# Patient Record
Sex: FEMALE | Race: WHITE | NOT HISPANIC OR LATINO | Employment: FULL TIME | URBAN - METROPOLITAN AREA
[De-identification: names, ages, dates, MRNs, and addresses within clinical notes are randomized per-mention and may not be internally consistent; named-entity substitution may affect disease eponyms.]

---

## 2018-10-11 ENCOUNTER — OFFICE VISIT (OUTPATIENT)
Dept: GASTROENTEROLOGY | Facility: CLINIC | Age: 28
End: 2018-10-11

## 2018-10-11 VITALS
WEIGHT: 200.6 LBS | HEIGHT: 63 IN | DIASTOLIC BLOOD PRESSURE: 66 MMHG | SYSTOLIC BLOOD PRESSURE: 118 MMHG | BODY MASS INDEX: 35.54 KG/M2

## 2018-10-11 DIAGNOSIS — K59.00 CONSTIPATION, UNSPECIFIED CONSTIPATION TYPE: ICD-10-CM

## 2018-10-11 DIAGNOSIS — R10.10 PAIN OF UPPER ABDOMEN: Primary | ICD-10-CM

## 2018-10-11 DIAGNOSIS — K92.1 BLOOD IN STOOL: ICD-10-CM

## 2018-10-11 PROCEDURE — 99203 OFFICE O/P NEW LOW 30 MIN: CPT | Performed by: INTERNAL MEDICINE

## 2018-10-11 RX ORDER — SODIUM PICOSULFATE, MAGNESIUM OXIDE, AND ANHYDROUS CITRIC ACID 10; 3.5; 12 MG/160ML; G/160ML; G/160ML
1 LIQUID ORAL 2 TIMES DAILY
Qty: 320 ML | Refills: 0 | Status: SHIPPED | OUTPATIENT
Start: 2018-10-11

## 2018-10-11 NOTE — PROGRESS NOTES
"    PATIENT INFORMATION  Pam Taylor       - 1990    CHIEF COMPLAINT  Chief Complaint   Patient presents with   • Abdominal Pain   • Constipation   • Diarrhea       HISTORY OF PRESENT ILLNESS  Abdominal Pain   Associated symptoms include constipation, diarrhea and nausea.   Constipation   Associated symptoms include abdominal pain, diarrhea and nausea.   Diarrhea    Associated symptoms include abdominal pain.     29 yo with several issues:  Abdominal pain is intermittent, epigastric and rib cage area. Occurs maybe 1-2 times monthly for the past 3-4 days. No radiation. No known aggravating factors. Heating pad did help.   She has had issues withconstipation since the birth of her first child. She does have a lot of rectal hesitancy and sensation of incomplete evacuation. She did note that intermittently she notes red stool.BM are 1-2 times weekly. She does have to strain a lot and notes thin stools then. No diarrhea.She has tried colace without help. miralax tried but she \"can't take powders\".   She had stool studies done at pcp office and was advised to come in.  No labs/stool studies available to review now.  Weight has been increasing.   No family history of crohns or UC.  No family history of colon cancer or polyp    No previous cls.  REVIEW OF SYSTEMS  Review of Systems   Gastrointestinal: Positive for abdominal pain, anal bleeding, constipation, diarrhea and nausea.   All other systems reviewed and are negative.    Ros reviewed.    ACTIVE PROBLEMS  Patient Active Problem List    Diagnosis   • Blood in stool [K92.1]   • Pain of upper abdomen [R10.10]   • Constipation [K59.00]         PAST MEDICAL HISTORY  History reviewed. No pertinent past medical history.      SURGICAL HISTORY  Past Surgical History:   Procedure Laterality Date   • FOOT SURGERY           FAMILY HISTORY  Family History   Problem Relation Age of Onset   • Colon cancer Neg Hx    • Colon polyps Neg Hx          SOCIAL HISTORY  Social " "History     Occupational History   • Not on file.     Social History Main Topics   • Smoking status: Never Smoker   • Smokeless tobacco: Never Used   • Alcohol use No   • Drug use: Unknown   • Sexual activity: Not on file       Debilities/Disabilities Identified: None    Emotional Behavior: Appropriate    CURRENT MEDICATIONS    Current Outpatient Prescriptions:   •  CLENPIQ 10-3.5-12 MG-GM -GM/160ML solution, Take 1 bottle by mouth 2 (Two) Times a Day., Disp: 320 mL, Rfl: 0    ALLERGIES  Penicillins    VITALS  Vitals:    10/11/18 1209   BP: 118/66   Weight: 91 kg (200 lb 9.6 oz)   Height: 160 cm (63\")       LAST RESULTS   Admission on 04/30/2014, Discharged on 04/30/2014   Component Date Value Ref Range Status   • WBC 04/30/2014 13.88* 4.80 - 10.80 K/Cumm Final   • RBC 04/30/2014 4.59  4.20 - 5.40 Million Final   • Hemoglobin 04/30/2014 12.8  12.0 - 16.0 g/dL Final   • Hematocrit 04/30/2014 38.5  37.0 - 47.0 % Final   • MCV 04/30/2014 83.9  81.0 - 99.0 fL Final   • MCH 04/30/2014 27.9  27.0 - 31.0 pg Final   • MCHC 04/30/2014 33.2  31.0 - 37.0 g/dL Final   • RDW-CV 04/30/2014 13.2  11.5 - 14.5 % Final   • Platelets 04/30/2014 343  140 - 500 K/Cumm Final   • MPV 04/30/2014 10.8* 7.4 - 10.4 fL Final   • Neutrophil Rel % 04/30/2014 70  45 - 70 % Final   • Lymphocyte Rel % 04/30/2014 20  20 - 45 % Final   • Monocyte Rel % 04/30/2014 6  3 - 8 % Final   • Eosinophil Rel % 04/30/2014 3  0 - 4 % Final   • Basophil Rel % 04/30/2014 1  0 - 2 % Final   • Neutrophils Absolute 04/30/2014 9.70* 1.50 - 8.30 K/Cumm Final   • Lymphocytes Absolute 04/30/2014 2.77  0.60 - 4.80 K/Cumm Final   • Monocytes Absolute 04/30/2014 0.88  0.00 - 1.00 K/Cumm Final   • Eosinophils Absolute 04/30/2014 0.45* 0.10 - 0.30 K/Cumm Final   • Basophils Absolute 04/30/2014 0.08  0.00 - 0.20 K/Cumm Final   • Glucose 04/30/2014 96  65 - 99 mg/dL Final   • BUN 04/30/2014 15  6 - 20 mg/dL Final   • Creatinine 04/30/2014 1.08* 0.57 - 1.00 mg/dL Final   • " Sodium 04/30/2014 137  136 - 145 mmol/L Final   • Potassium 04/30/2014 4.0  3.5 - 5.1 mmol/L Final   • Chloride 04/30/2014 100  98 - 107 mmol/L Final   • CO2 04/30/2014 24  22 - 29 mmol/L Final   • Total Protein 04/30/2014 7.6  6.4 - 8.3 g/dL Final   • Albumin 04/30/2014 4.3  3.5 - 5.2 g/dL Final   • AST (SGOT) 04/30/2014 24  0 - 31 U/L Final   • Calcium 04/30/2014 8.9  8.8 - 10.2 mg/dL Final   • Total Bilirubin 04/30/2014 0.2  0.1 - 1.2 mg/dL Final   • Alkaline Phosphatase 04/30/2014 86  40 - 129 U/L Final   • ALT (SGPT) 04/30/2014 22  0 - 33 U/L Final   • eGFR 04/30/2014 >60  ml/min/1.732 Final    Comment: DF by IF @ 04/30/2014 00:54  GFR Normal                            >60  Chronic Kidney Disease          <60  Kidney Failure                         <15     • HCG Urine, QL 04/30/2014 Negative   Final   • Color, UA 04/30/2014 Yellow  YELLOW Final   • Appearance, UA 04/30/2014 Clear  CLEAR Final   • Glucose, UA 04/30/2014 Negative  NEGATIVE mg/dL Final   • Bilirubin, UA 04/30/2014 Negative  NEGATIVE Final   • Ketones, UA 04/30/2014 Negative  NEGATIVE mg/dL Final   • Specific Gravity, UA 04/30/2014 1.020  1.003 - 1.030 Final   • pH, UA 04/30/2014 7.0  4.5 - 8.0 Final   • Protein, UA 04/30/2014 100* NEGATIVE,TRACE mg/dL Final   • Urobilinogen, UA 04/30/2014 0.2  0.2 - 1.0 E.U./dL Final   • Nitrite, UA 04/30/2014 Negative  NEGATIVE Final   • Blood, UA 04/30/2014 Large* NEGATIVE Final   • Leukocytes, UA 04/30/2014 Trace* NEGATIVE Final   • WBC, UA 04/30/2014 31-50* NONE SEEN,0-2,3-6 /hpf Final   • RBC, UA 04/30/2014 TNTC* NONE SEEN,0-2,3-6 /hpf Final   • Epithelial Cells, UA 04/30/2014 13-20* NONE SEEN,0-2,3-6 /hpf Final   • Bacteria, UA 04/30/2014 1+* NONE SEEN /hpf Final   • Casts 04/30/2014 None Seen   Final   • Observations 04/30/2014 WBC Clumps   Final     No results found.    PHYSICAL EXAM  Physical Exam   Constitutional: She is oriented to person, place, and time. She appears well-developed and well-nourished.  No distress.   HENT:   Head: Normocephalic and atraumatic.   Mouth/Throat: Oropharynx is clear and moist.   Eyes: Pupils are equal, round, and reactive to light. EOM are normal.   Neck: Normal range of motion. No tracheal deviation present.   Cardiovascular: Normal rate, regular rhythm, normal heart sounds and intact distal pulses.  Exam reveals no gallop and no friction rub.    No murmur heard.  Pulmonary/Chest: Effort normal and breath sounds normal. No stridor. No respiratory distress. She has no wheezes. She has no rales. She exhibits no tenderness.   Abdominal: Soft. Bowel sounds are normal. She exhibits no distension. There is no tenderness. There is no rebound and no guarding.   Musculoskeletal: She exhibits no edema.   Lymphadenopathy:     She has no cervical adenopathy.   Neurological: She is alert and oriented to person, place, and time.   Skin: Skin is warm. She is not diaphoretic.   Psychiatric: She has a normal mood and affect. Her behavior is normal. Judgment and thought content normal.   Nursing note and vitals reviewed.      ASSESSMENT  Diagnoses and all orders for this visit:    Pain of upper abdomen  -     TSH  -     Celiac Ab tTG DGP TIgA  -     CBC (No Diff)  -     Case Request; Standing  -     Case Request    Constipation, unspecified constipation type  -     TSH  -     Celiac Ab tTG DGP TIgA  -     CBC (No Diff)  -     Case Request; Standing  -     Case Request    Blood in stool  -     Case Request; Standing  -     Case Request    Other orders  -     Follow Anesthesia Guidelines / Standing Orders; Future  -     Implement Anesthesia orders day of procedure.; Standing  -     Obtain informed consent; Standing  -     CLENPIQ 10-3.5-12 MG-GM -GM/160ML solution; Take 1 bottle by mouth 2 (Two) Times a Day.          PLAN  No Follow-up on file.    Requested labs/stool studies from pcp office      Addendum: cbc with hemoglobin of 13.2, cmp with normal liver enzymes.  Urine with small blood. Fecal  calprotectin elevated at 94

## 2018-11-08 ENCOUNTER — ANESTHESIA EVENT (OUTPATIENT)
Dept: PERIOP | Facility: HOSPITAL | Age: 28
End: 2018-11-08

## 2018-11-09 ENCOUNTER — HOSPITAL ENCOUNTER (OUTPATIENT)
Facility: HOSPITAL | Age: 28
Setting detail: HOSPITAL OUTPATIENT SURGERY
Discharge: HOME | End: 2018-11-09
Attending: INTERNAL MEDICINE | Admitting: INTERNAL MEDICINE

## 2018-11-09 ENCOUNTER — ANESTHESIA (OUTPATIENT)
Dept: PERIOP | Facility: HOSPITAL | Age: 28
End: 2018-11-09

## 2018-11-09 VITALS
RESPIRATION RATE: 10 BRPM | DIASTOLIC BLOOD PRESSURE: 86 MMHG | SYSTOLIC BLOOD PRESSURE: 128 MMHG | WEIGHT: 201.6 LBS | OXYGEN SATURATION: 93 % | HEART RATE: 77 BPM | BODY MASS INDEX: 35.71 KG/M2 | TEMPERATURE: 97.9 F

## 2018-11-09 PROCEDURE — 45378 DIAGNOSTIC COLONOSCOPY: CPT | Performed by: INTERNAL MEDICINE

## 2018-11-09 PROCEDURE — 25010000002 PROPOFOL 10 MG/ML EMULSION: Performed by: NURSE ANESTHETIST, CERTIFIED REGISTERED

## 2018-11-09 RX ORDER — SODIUM CHLORIDE 0.9 % (FLUSH) 0.9 %
1-10 SYRINGE (ML) INJECTION AS NEEDED
Status: DISCONTINUED | OUTPATIENT
Start: 2018-11-09 | End: 2018-11-09 | Stop reason: HOSPADM

## 2018-11-09 RX ORDER — PROPOFOL 10 MG/ML
VIAL (ML) INTRAVENOUS AS NEEDED
Status: DISCONTINUED | OUTPATIENT
Start: 2018-11-09 | End: 2018-11-09 | Stop reason: SURG

## 2018-11-09 RX ORDER — SODIUM CHLORIDE, SODIUM LACTATE, POTASSIUM CHLORIDE, CALCIUM CHLORIDE 600; 310; 30; 20 MG/100ML; MG/100ML; MG/100ML; MG/100ML
9 INJECTION, SOLUTION INTRAVENOUS CONTINUOUS
Status: DISCONTINUED | OUTPATIENT
Start: 2018-11-09 | End: 2018-11-09 | Stop reason: HOSPADM

## 2018-11-09 RX ORDER — LIDOCAINE HYDROCHLORIDE 10 MG/ML
0.5 INJECTION, SOLUTION EPIDURAL; INFILTRATION; INTRACAUDAL; PERINEURAL ONCE AS NEEDED
Status: COMPLETED | OUTPATIENT
Start: 2018-11-09 | End: 2018-11-09

## 2018-11-09 RX ORDER — PROPOFOL 10 MG/ML
VIAL (ML) INTRAVENOUS CONTINUOUS PRN
Status: DISCONTINUED | OUTPATIENT
Start: 2018-11-09 | End: 2018-11-09 | Stop reason: SURG

## 2018-11-09 RX ORDER — ALBUTEROL SULFATE 90 UG/1
2 AEROSOL, METERED RESPIRATORY (INHALATION) EVERY 4 HOURS PRN
COMMUNITY

## 2018-11-09 RX ORDER — SODIUM CHLORIDE 9 MG/ML
40 INJECTION, SOLUTION INTRAVENOUS AS NEEDED
Status: DISCONTINUED | OUTPATIENT
Start: 2018-11-09 | End: 2018-11-09 | Stop reason: HOSPADM

## 2018-11-09 RX ORDER — SODIUM CHLORIDE 0.9 % (FLUSH) 0.9 %
3 SYRINGE (ML) INJECTION EVERY 12 HOURS SCHEDULED
Status: DISCONTINUED | OUTPATIENT
Start: 2018-11-09 | End: 2018-11-09 | Stop reason: HOSPADM

## 2018-11-09 RX ORDER — LIDOCAINE HYDROCHLORIDE 10 MG/ML
INJECTION, SOLUTION INFILTRATION; PERINEURAL AS NEEDED
Status: DISCONTINUED | OUTPATIENT
Start: 2018-11-09 | End: 2018-11-09 | Stop reason: SURG

## 2018-11-09 RX ORDER — GLYCOPYRROLATE 0.2 MG/ML
INJECTION INTRAMUSCULAR; INTRAVENOUS AS NEEDED
Status: DISCONTINUED | OUTPATIENT
Start: 2018-11-09 | End: 2018-11-09 | Stop reason: SURG

## 2018-11-09 RX ADMIN — PROPOFOL 50 MG: 10 INJECTION, EMULSION INTRAVENOUS at 11:30

## 2018-11-09 RX ADMIN — SODIUM CHLORIDE, POTASSIUM CHLORIDE, SODIUM LACTATE AND CALCIUM CHLORIDE: 600; 310; 30; 20 INJECTION, SOLUTION INTRAVENOUS at 11:27

## 2018-11-09 RX ADMIN — PROPOFOL 100 MCG/KG/MIN: 10 INJECTION, EMULSION INTRAVENOUS at 11:28

## 2018-11-09 RX ADMIN — LIDOCAINE HYDROCHLORIDE 0.5 ML: 10 INJECTION, SOLUTION EPIDURAL; INFILTRATION; INTRACAUDAL; PERINEURAL at 09:12

## 2018-11-09 RX ADMIN — PROPOFOL 50 MG: 10 INJECTION, EMULSION INTRAVENOUS at 11:28

## 2018-11-09 RX ADMIN — GLYCOPYRROLATE 0.1 MG: 0.2 INJECTION INTRAMUSCULAR; INTRAVENOUS at 11:28

## 2018-11-09 RX ADMIN — PROPOFOL 50 MG: 10 INJECTION, EMULSION INTRAVENOUS at 11:41

## 2018-11-09 RX ADMIN — SODIUM CHLORIDE, POTASSIUM CHLORIDE, SODIUM LACTATE AND CALCIUM CHLORIDE 9 ML/HR: 600; 310; 30; 20 INJECTION, SOLUTION INTRAVENOUS at 09:12

## 2018-11-09 RX ADMIN — PROPOFOL 30 MG: 10 INJECTION, EMULSION INTRAVENOUS at 11:47

## 2018-11-09 RX ADMIN — PROPOFOL 50 MG: 10 INJECTION, EMULSION INTRAVENOUS at 11:36

## 2018-11-09 RX ADMIN — LIDOCAINE HYDROCHLORIDE 50 MG: 10 INJECTION, SOLUTION INFILTRATION; PERINEURAL at 11:28

## 2018-11-09 NOTE — H&P
" - 1990     CHIEF COMPLAINT      Chief Complaint   Patient presents with   • Abdominal Pain   • Constipation   • Diarrhea         HISTORY OF PRESENT ILLNESS  Abdominal Pain   Associated symptoms include constipation, diarrhea and nausea.   Constipation   Associated symptoms include abdominal pain, diarrhea and nausea.   Diarrhea    Associated symptoms include abdominal pain.      27 yo with several issues:  Abdominal pain is intermittent, epigastric and rib cage area. Occurs maybe 1-2 times monthly for the past 3-4 days. No radiation. No known aggravating factors. Heating pad did help.   She has had issues withconstipation since the birth of her first child. She does have a lot of rectal hesitancy and sensation of incomplete evacuation. She did note that intermittently she notes red stool.BM are 1-2 times weekly. She does have to strain a lot and notes thin stools then. No diarrhea.She has tried colace without help. miralax tried but she \"can't take powders\".   She had stool studies done at pcp office and was advised to come in.  No labs/stool studies available to review now.  Weight has been increasing.   No family history of crohns or UC.  No family history of colon cancer or polyp     No previous cls.  REVIEW OF SYSTEMS  Review of Systems   Gastrointestinal: Positive for abdominal pain, anal bleeding, constipation, diarrhea and nausea.   All other systems reviewed and are negative.     Ros reviewed.     ACTIVE PROBLEMS      Patient Active Problem List     Diagnosis   • Blood in stool [K92.1]   • Pain of upper abdomen [R10.10]   • Constipation [K59.00]            PAST MEDICAL HISTORY  Medical History   History reviewed. No pertinent past medical history.           SURGICAL HISTORY  Surgical History         Past Surgical History:   Procedure Laterality Date   • FOOT SURGERY                   FAMILY HISTORY  Family History   Problem Relation Age of Onset   • Colon cancer Neg Hx     • Colon polyps Neg Hx   "            SOCIAL HISTORY  Social History          Occupational History   • Not on file.           Social History Main Topics   • Smoking status: Never Smoker   • Smokeless tobacco: Never Used   • Alcohol use No   • Drug use: Unknown   • Sexual activity: Not on file         Debilities/Disabilities Identified: None     Emotional Behavior: Appropriate     CURRENT MEDICATIONS     Current Outpatient Prescriptions:   •  CLENPIQ 10-3.5-12 MG-GM -GM/160ML solution, Take 1 bottle by mouth 2 (Two) Times a Day., Disp: 320 mL, Rfl: 0     ALLERGIES  Penicillins     VITALS  Vitals   /61 (BP Location: Left arm, Patient Position: Sitting)   Pulse 83   Temp 97.9 °F (36.6 °C) (Oral)   Resp 16   Wt 91.4 kg (201 lb 9.6 oz)   LMP 10/26/2018   SpO2 100%   BMI 35.71 kg/m²                                   LAST RESULTS                   Admission on 04/30/2014, Discharged on 04/30/2014   Component Date Value Ref Range Status   • WBC 04/30/2014 13.88* 4.80 - 10.80 K/Cumm Final   • RBC 04/30/2014 4.59  4.20 - 5.40 Million Final   • Hemoglobin 04/30/2014 12.8  12.0 - 16.0 g/dL Final   • Hematocrit 04/30/2014 38.5  37.0 - 47.0 % Final   • MCV 04/30/2014 83.9  81.0 - 99.0 fL Final   • MCH 04/30/2014 27.9  27.0 - 31.0 pg Final   • MCHC 04/30/2014 33.2  31.0 - 37.0 g/dL Final   • RDW-CV 04/30/2014 13.2  11.5 - 14.5 % Final   • Platelets 04/30/2014 343  140 - 500 K/Cumm Final   • MPV 04/30/2014 10.8* 7.4 - 10.4 fL Final   • Neutrophil Rel % 04/30/2014 70  45 - 70 % Final   • Lymphocyte Rel % 04/30/2014 20  20 - 45 % Final   • Monocyte Rel % 04/30/2014 6  3 - 8 % Final   • Eosinophil Rel % 04/30/2014 3  0 - 4 % Final   • Basophil Rel % 04/30/2014 1  0 - 2 % Final   • Neutrophils Absolute 04/30/2014 9.70* 1.50 - 8.30 K/Cumm Final   • Lymphocytes Absolute 04/30/2014 2.77  0.60 - 4.80 K/Cumm Final   • Monocytes Absolute 04/30/2014 0.88  0.00 - 1.00 K/Cumm Final   • Eosinophils Absolute 04/30/2014 0.45* 0.10 - 0.30 K/Cumm Final   •  Basophils Absolute 04/30/2014 0.08  0.00 - 0.20 K/Cumm Final   • Glucose 04/30/2014 96  65 - 99 mg/dL Final   • BUN 04/30/2014 15  6 - 20 mg/dL Final   • Creatinine 04/30/2014 1.08* 0.57 - 1.00 mg/dL Final   • Sodium 04/30/2014 137  136 - 145 mmol/L Final   • Potassium 04/30/2014 4.0  3.5 - 5.1 mmol/L Final   • Chloride 04/30/2014 100  98 - 107 mmol/L Final   • CO2 04/30/2014 24  22 - 29 mmol/L Final   • Total Protein 04/30/2014 7.6  6.4 - 8.3 g/dL Final   • Albumin 04/30/2014 4.3  3.5 - 5.2 g/dL Final   • AST (SGOT) 04/30/2014 24  0 - 31 U/L Final   • Calcium 04/30/2014 8.9  8.8 - 10.2 mg/dL Final   • Total Bilirubin 04/30/2014 0.2  0.1 - 1.2 mg/dL Final   • Alkaline Phosphatase 04/30/2014 86  40 - 129 U/L Final   • ALT (SGPT) 04/30/2014 22  0 - 33 U/L Final   • eGFR 04/30/2014 >60  ml/min/1.732 Final     Comment: DF by IF @ 04/30/2014 00:54  GFR Normal                            >60  Chronic Kidney Disease          <60  Kidney Failure                         <15      • HCG Urine, QL 04/30/2014 Negative    Final   • Color, UA 04/30/2014 Yellow  YELLOW Final   • Appearance, UA 04/30/2014 Clear  CLEAR Final   • Glucose, UA 04/30/2014 Negative  NEGATIVE mg/dL Final   • Bilirubin, UA 04/30/2014 Negative  NEGATIVE Final   • Ketones, UA 04/30/2014 Negative  NEGATIVE mg/dL Final   • Specific Gravity, UA 04/30/2014 1.020  1.003 - 1.030 Final   • pH, UA 04/30/2014 7.0  4.5 - 8.0 Final   • Protein, UA 04/30/2014 100* NEGATIVE,TRACE mg/dL Final   • Urobilinogen, UA 04/30/2014 0.2  0.2 - 1.0 E.U./dL Final   • Nitrite, UA 04/30/2014 Negative  NEGATIVE Final   • Blood, UA 04/30/2014 Large* NEGATIVE Final   • Leukocytes, UA 04/30/2014 Trace* NEGATIVE Final   • WBC, UA 04/30/2014 31-50* NONE SEEN,0-2,3-6 /hpf Final   • RBC, UA 04/30/2014 TNTC* NONE SEEN,0-2,3-6 /hpf Final   • Epithelial Cells, UA 04/30/2014 13-20* NONE SEEN,0-2,3-6 /hpf Final   • Bacteria, UA 04/30/2014 1+* NONE SEEN /hpf Final   • Casts 04/30/2014 None Seen     Final   • Observations 04/30/2014 WBC Clumps    Final      No results found.     PHYSICAL EXAM  Physical Exam   Constitutional: She is oriented to person, place, and time. She appears well-developed and well-nourished. No distress.   HENT:   Head: Normocephalic and atraumatic.   Mouth/Throat: Oropharynx is clear and moist.   Eyes: Pupils are equal, round, and reactive to light. EOM are normal.   Neck: Normal range of motion. No tracheal deviation present.   Cardiovascular: Normal rate, regular rhythm, normal heart sounds and intact distal pulses.  Exam reveals no gallop and no friction rub.    No murmur heard.  Pulmonary/Chest: Effort normal and breath sounds normal. No stridor. No respiratory distress. She has no wheezes. She has no rales. She exhibits no tenderness.   Abdominal: Soft. Bowel sounds are normal. She exhibits no distension. There is no tenderness. There is no rebound and no guarding.   Musculoskeletal: She exhibits no edema.   Lymphadenopathy:     She has no cervical adenopathy.   Neurological: She is alert and oriented to person, place, and time.   Skin: Skin is warm. She is not diaphoretic.   Psychiatric: She has a normal mood and affect. Her behavior is normal. Judgment and thought content normal.   Nursing note and vitals reviewed.        ASSESSMENT  Diagnoses and all orders for this visit:     Pain of upper abdomen  -     TSH  -     Celiac Ab tTG DGP TIgA  -     CBC (No Diff)  -     Case Request; Standing  -     Case Request     Constipation, unspecified constipation type  -     TSH  -     Celiac Ab tTG DGP TIgA  -     CBC (No Diff)  -     Case Request; Standing  -     Case Request     Blood in stool  -     Case Request; Standing  -     Case Request     Other orders  -     Follow Anesthesia Guidelines / Standing Orders; Future  -     Implement Anesthesia orders day of procedure.; Standing  -     Obtain informed consent; Standing  -     CLENPIQ 10-3.5-12 MG-GM -GM/160ML solution; Take 1 bottle by  mouth 2 (Two) Times a Day.              PLAN  No Follow-up on file.     Requested labs/stool studies from pcp office        Addendum: cbc with hemoglobin of 13.2, cmp with normal liver enzymes.  Urine with small blood. Fecal calprotectin elevated at 94

## 2018-11-09 NOTE — ANESTHESIA POSTPROCEDURE EVALUATION
Patient: Pam Taylor    Procedure Summary     Date:  11/09/18 Room / Location:  [facility] ENDOSCOPY 2 /  [facility]    Anesthesia Start:  1127 Anesthesia Stop:  1151    Procedure:  COLONOSCOPY (N/A ) Diagnosis:       Blood in stool      Pain of upper abdomen      Constipation, unspecified constipation type      (Blood in stool [K92.1])      (Pain of upper abdomen [R10.10])      (Constipation, unspecified constipation type [K59.00])    Surgeon:  Nyla Ames MD Provider:  Joe España CRNA    Anesthesia Type:  MAC ASA Status:  2          Anesthesia Type: MAC  Last vitals  BP   128/86 (11/09/18 1154)   Temp   97.9 °F (36.6 °C) (11/09/18 0853)   Pulse   77 (11/09/18 1154)   Resp   10 (11/09/18 1154)     SpO2   93 % (11/09/18 1154)     Post Anesthesia Care and Evaluation    Patient location during evaluation: PHASE II  Patient participation: complete - patient participated  Level of consciousness: awake and alert  Pain score: 0  Pain management: adequate  Airway patency: patent  Anesthetic complications: No anesthetic complications  PONV Status: none  Cardiovascular status: acceptable  Respiratory status: acceptable  Hydration status: acceptable

## 2018-11-09 NOTE — OP NOTE
Patient Name:  Pam Taylor  YOB: 1990    Date of Procedure:  11/9/2018    Procedure Performed: Colonoscopy     Indications:  Blood in stool, abdominal pain constipation    Pre-op Diagnosis:   Blood in stool [K92.1]  Pain of upper abdomen [R10.10]  Constipation, unspecified constipation type [K59.00]    Post-Op Diagnosis Codes:     * Blood in stool [K92.1]     * Pain of upper abdomen [R10.10]     * Constipation, unspecified constipation type [K59.00]         Staff:  Surgeon(s):  Nyla Ames MD         Consent: Procedure colonoscopy Indications, risks and procedure were discussed with the patient, including but not limited to, bleeding, infection, possibility of perforation and possible polypectomy. All of the patient's questions were answered, and signed informed consent was obtained and placed on the chart.      Sedation: Sedation was provided by anesthesia.      Estimated Blood Loss: none    Specimens: None      Description of Procedure: After excellent sedation a digital rectal examination is performed and a flexible colonoscope was inserted into the rectum passed to the cecum.  The cecum was identified by both the ileo- cecal valve and the appendiceal orifice.  The terminal ileum was entered this appeared normal.  Upon withdrawal scope careful examination the mucosa was made.  The entire examined colonic mucosa was normal without any evidence of inflammation.  No polyps are noted.  The scope was slowly withdrawn to the rectum and retroflex were internal hemorrhoids are noted.  The scope was then straightened and withdrawn out of the patient with no immediate, patient's and she tolerated the procedure well.      Findings:   Internal hemorrhoids otherwise normal colonoscopy    Complications: None        Nyla Ames MD     Date: 11/9/2018  Time: 11:52 AM

## 2018-11-09 NOTE — ANESTHESIA PREPROCEDURE EVALUATION
Anesthesia Evaluation     Patient summary reviewed and Nursing notes reviewed   NPO Solid Status: > 8 hours  NPO Liquid Status: > 8 hours           Airway   Mallampati: II  TM distance: >3 FB  Neck ROM: full  No difficulty expected  Dental - normal exam     Pulmonary - normal exam    breath sounds clear to auscultation  (+) asthma (hospitalized as small child with asthma.),   Cardiovascular - negative cardio ROS and normal exam    Rhythm: regular  Rate: normal        Neuro/Psych- negative ROS  GI/Hepatic/Renal/Endo    (+)  GI bleeding (blood in stool),     Musculoskeletal (-) negative ROS    Abdominal    Substance History - negative use     OB/GYN          Other - negative ROS                       Anesthesia Plan    ASA 2     MAC     intravenous induction   Anesthetic plan, all risks, benefits, and alternatives have been provided, discussed and informed consent has been obtained with: patient.

## 2023-12-06 ENCOUNTER — OFFICE VISIT (OUTPATIENT)
Dept: GASTROENTEROLOGY | Facility: CLINIC | Age: 33
End: 2023-12-06
Payer: COMMERCIAL

## 2023-12-06 VITALS
WEIGHT: 207.8 LBS | SYSTOLIC BLOOD PRESSURE: 120 MMHG | BODY MASS INDEX: 36.82 KG/M2 | DIASTOLIC BLOOD PRESSURE: 78 MMHG | HEIGHT: 63 IN

## 2023-12-06 DIAGNOSIS — K59.04 CHRONIC IDIOPATHIC CONSTIPATION: ICD-10-CM

## 2023-12-06 DIAGNOSIS — R10.13 DYSPEPSIA: Primary | ICD-10-CM

## 2023-12-06 PROCEDURE — 99204 OFFICE O/P NEW MOD 45 MIN: CPT | Performed by: INTERNAL MEDICINE

## 2023-12-06 RX ORDER — BUDESONIDE AND FORMOTEROL FUMARATE DIHYDRATE 160; 4.5 UG/1; UG/1
2 AEROSOL RESPIRATORY (INHALATION) AS NEEDED
COMMUNITY
Start: 2023-10-23

## 2023-12-06 RX ORDER — OMEGA-3 FATTY ACIDS/FISH OIL 300-1000MG
CAPSULE ORAL AS NEEDED
COMMUNITY

## 2023-12-06 RX ORDER — OMEPRAZOLE 40 MG/1
1 CAPSULE, DELAYED RELEASE ORAL DAILY
COMMUNITY
Start: 2023-10-23

## 2023-12-06 NOTE — PROGRESS NOTES
"    PATIENT INFORMATION  Pam Taylor       - 1990    CHIEF COMPLAINT  Chief Complaint   Patient presents with    Heartburn       HISTORY OF PRESENT ILLNESS  Is here after seeing Kwaku for rectal bleeding but really her complaints are for nocturnal burping and epigastric pain bloating and takes NSAIDS for neck injury/HA    Also can go a week w/o a BM so still has those issues    Has only been taking her omeprazole 40 mg daily some before supper and some in the AM - and has had a good response to that medicine.    No HP testing that she is aware of            REVIEWED PERTINENT RESULTS/ LABS  No results found for: \"CASEREPORT\", \"FINALDX\"  Lab Results   Component Value Date    HGB 12.8 2014    MCV 83.9 2014     2014    ALT 22 2014    AST 24 2014      No results found.    REVIEW OF SYSTEMS  Review of Systems   Constitutional:  Negative for activity change, chills, fever and unexpected weight change.   HENT:  Negative for congestion.    Eyes:  Negative for visual disturbance.   Respiratory:  Negative for shortness of breath.    Cardiovascular:  Negative for chest pain and palpitations.   Gastrointestinal:  Positive for abdominal distention, abdominal pain, anal bleeding (ext hemorrhoid), blood in stool, constipation and diarrhea.   Endocrine: Negative for cold intolerance and heat intolerance.   Genitourinary:  Negative for hematuria.   Musculoskeletal:  Negative for gait problem.   Skin:  Negative for color change.   Allergic/Immunologic: Negative for immunocompromised state.   Neurological:  Negative for weakness and light-headedness.   Hematological:  Negative for adenopathy.   Psychiatric/Behavioral:  Negative for sleep disturbance. The patient is not nervous/anxious.          ACTIVE PROBLEMS  Patient Active Problem List    Diagnosis     Blood in stool [K92.1]     Pain of upper abdomen [R10.10]     Constipation [K59.00]          PAST MEDICAL HISTORY  Past Medical " "History:   Diagnosis Date    Asthma     Asthma     GERD (gastroesophageal reflux disease)          SURGICAL HISTORY  Past Surgical History:   Procedure Laterality Date    COLONOSCOPY N/A 11/09/2018    Procedure: COLONOSCOPY;  Surgeon: Nyla Ames MD;  Location: [facility];  Service: Gastroenterology    FOOT SURGERY      LAPAROSCOPIC TUBAL LIGATION           FAMILY HISTORY  Family History   Problem Relation Age of Onset    Hypertension Father     Esophagitis Sister     SUBHASH disease Sister     Irritable bowel syndrome Sister     Diverticulitis Sister     Irritable bowel syndrome Sister     Colon cancer Neg Hx     Colon polyps Neg Hx          SOCIAL HISTORY  Social History     Occupational History    Not on file   Tobacco Use    Smoking status: Never     Passive exposure: Never    Smokeless tobacco: Never    Tobacco comments:     N/A   Vaping Use    Vaping Use: Never used   Substance and Sexual Activity    Alcohol use: Yes     Comment: socialy    Drug use: No    Sexual activity: Defer         CURRENT MEDICATIONS    Current Outpatient Medications:     albuterol (PROVENTIL HFA;VENTOLIN HFA) 108 (90 Base) MCG/ACT inhaler, Inhale 2 puffs Every 4 (Four) Hours As Needed for Wheezing., Disp: , Rfl:     budesonide-formoterol (SYMBICORT) 160-4.5 MCG/ACT inhaler, Inhale 2 puffs As Needed., Disp: , Rfl:     Ibuprofen 200 MG capsule, Take  by mouth As Needed., Disp: , Rfl:     omeprazole (priLOSEC) 40 MG capsule, Take 1 capsule by mouth Daily., Disp: , Rfl:     ALLERGIES  Augmentin [amoxicillin-pot clavulanate] and Penicillins    VITALS  Vitals:    12/06/23 0851   BP: 120/78   BP Location: Left arm   Patient Position: Sitting   Cuff Size: Large Adult   Weight: 94.3 kg (207 lb 12.8 oz)   Height: 160 cm (63\")       PHYSICAL EXAM  Debilities/Disabilities Identified: None  Emotional Behavior: Appropriate  Wt Readings from Last 3 Encounters:   12/06/23 94.3 kg (207 lb 12.8 oz)   11/09/18 91.4 kg (201 lb 9.6 oz)   10/11/18 91 kg (200 " "lb 9.6 oz)     Ht Readings from Last 1 Encounters:   12/06/23 160 cm (63\")     Body mass index is 36.81 kg/m².  Physical Exam  Constitutional:       Appearance: She is well-developed. She is not diaphoretic.   HENT:      Head: Normocephalic and atraumatic.   Eyes:      General: No scleral icterus.     Conjunctiva/sclera: Conjunctivae normal.      Pupils: Pupils are equal, round, and reactive to light.   Neck:      Thyroid: No thyromegaly.   Cardiovascular:      Rate and Rhythm: Normal rate and regular rhythm.      Heart sounds: Normal heart sounds. No murmur heard.     No gallop.   Pulmonary:      Effort: Pulmonary effort is normal.      Breath sounds: Normal breath sounds. No wheezing or rales.   Abdominal:      General: Bowel sounds are normal. There is no distension or abdominal bruit.      Palpations: Abdomen is soft. There is no shifting dullness, fluid wave or mass.      Tenderness: There is abdominal tenderness in the right upper quadrant, right lower quadrant and epigastric area. There is no guarding. Negative signs include Rodriguez's sign.      Hernia: There is no hernia in the ventral area.   Musculoskeletal:         General: Normal range of motion.      Cervical back: Normal range of motion and neck supple.   Lymphadenopathy:      Cervical: No cervical adenopathy.   Skin:     General: Skin is warm and dry.      Findings: No erythema or rash.   Neurological:      Mental Status: She is alert and oriented to person, place, and time.   Psychiatric:         Mood and Affect: Mood normal.         Behavior: Behavior normal.         CLINICAL DATA REVIEWED   reviewed previous lab results and integrated with today's visit, reviewed notes from other physicians and/or last GI encounter, reviewed previous endoscopy results and available photos, reviewed surgical pathology results from previous biopsies    ASSESSMENT  Diagnoses and all orders for this visit:    Dyspepsia  -     Case Request; Standing  -     Case " Request    Chronic idiopathic constipation    Other orders  -     budesonide-formoterol (SYMBICORT) 160-4.5 MCG/ACT inhaler; Inhale 2 puffs As Needed.  -     Ibuprofen 200 MG capsule; Take  by mouth As Needed.  -     omeprazole (priLOSEC) 40 MG capsule; Take 1 capsule by mouth Daily.  -     Obtain Informed Consent; Standing  -     Follow Anesthesia Guidelines / Protocol; Future          PLAN  Reviewed suportive care forher GERD and Constipation and edwige send Linzess  Return in about 2 months (around 2/6/2024).    I have discussed the above plan with the patient.  They verbalize understanding and are in agreement with the plan.  They have been advised to contact the office for any questions, concerns, or changes related to their health.

## 2024-01-30 ENCOUNTER — ANESTHESIA EVENT (OUTPATIENT)
Dept: PERIOP | Facility: HOSPITAL | Age: 34
End: 2024-01-30
Payer: COMMERCIAL

## 2024-01-31 ENCOUNTER — ANESTHESIA (OUTPATIENT)
Dept: PERIOP | Facility: HOSPITAL | Age: 34
End: 2024-01-31
Payer: COMMERCIAL

## 2024-01-31 ENCOUNTER — HOSPITAL ENCOUNTER (OUTPATIENT)
Facility: HOSPITAL | Age: 34
Setting detail: HOSPITAL OUTPATIENT SURGERY
Discharge: HOME | End: 2024-01-31
Attending: INTERNAL MEDICINE | Admitting: INTERNAL MEDICINE
Payer: COMMERCIAL

## 2024-01-31 VITALS
DIASTOLIC BLOOD PRESSURE: 64 MMHG | OXYGEN SATURATION: 100 % | WEIGHT: 205 LBS | RESPIRATION RATE: 16 BRPM | TEMPERATURE: 97.9 F | HEART RATE: 71 BPM | BODY MASS INDEX: 36.31 KG/M2 | SYSTOLIC BLOOD PRESSURE: 112 MMHG

## 2024-01-31 DIAGNOSIS — R10.13 DYSPEPSIA: ICD-10-CM

## 2024-01-31 PROCEDURE — 88305 TISSUE EXAM BY PATHOLOGIST: CPT | Performed by: INTERNAL MEDICINE

## 2024-01-31 PROCEDURE — 88342 IMHCHEM/IMCYTCHM 1ST ANTB: CPT | Performed by: INTERNAL MEDICINE

## 2024-01-31 PROCEDURE — 43239 EGD BIOPSY SINGLE/MULTIPLE: CPT | Performed by: INTERNAL MEDICINE

## 2024-01-31 PROCEDURE — 25010000002 PROPOFOL 10 MG/ML EMULSION: Performed by: NURSE ANESTHETIST, CERTIFIED REGISTERED

## 2024-01-31 PROCEDURE — 25810000003 LACTATED RINGERS PER 1000 ML: Performed by: NURSE ANESTHETIST, CERTIFIED REGISTERED

## 2024-01-31 PROCEDURE — 88312 SPECIAL STAINS GROUP 1: CPT | Performed by: INTERNAL MEDICINE

## 2024-01-31 PROCEDURE — 88313 SPECIAL STAINS GROUP 2: CPT | Performed by: INTERNAL MEDICINE

## 2024-01-31 PROCEDURE — 88341 IMHCHEM/IMCYTCHM EA ADD ANTB: CPT | Performed by: INTERNAL MEDICINE

## 2024-01-31 RX ORDER — SODIUM CHLORIDE, SODIUM LACTATE, POTASSIUM CHLORIDE, CALCIUM CHLORIDE 600; 310; 30; 20 MG/100ML; MG/100ML; MG/100ML; MG/100ML
9 INJECTION, SOLUTION INTRAVENOUS CONTINUOUS PRN
Status: DISCONTINUED | OUTPATIENT
Start: 2024-01-31 | End: 2024-01-31 | Stop reason: HOSPADM

## 2024-01-31 RX ORDER — OMEPRAZOLE 40 MG/1
40 CAPSULE, DELAYED RELEASE ORAL
Qty: 180 CAPSULE | Refills: 3 | Status: SHIPPED | OUTPATIENT
Start: 2024-01-31

## 2024-01-31 RX ORDER — SODIUM CHLORIDE 0.9 % (FLUSH) 0.9 %
10 SYRINGE (ML) INJECTION EVERY 12 HOURS SCHEDULED
Status: DISCONTINUED | OUTPATIENT
Start: 2024-01-31 | End: 2024-01-31 | Stop reason: HOSPADM

## 2024-01-31 RX ORDER — SODIUM CHLORIDE 0.9 % (FLUSH) 0.9 %
10 SYRINGE (ML) INJECTION AS NEEDED
Status: DISCONTINUED | OUTPATIENT
Start: 2024-01-31 | End: 2024-01-31 | Stop reason: HOSPADM

## 2024-01-31 RX ORDER — LIDOCAINE HYDROCHLORIDE 20 MG/ML
INJECTION, SOLUTION INFILTRATION; PERINEURAL AS NEEDED
Status: DISCONTINUED | OUTPATIENT
Start: 2024-01-31 | End: 2024-01-31 | Stop reason: SURG

## 2024-01-31 RX ORDER — PROPOFOL 10 MG/ML
VIAL (ML) INTRAVENOUS AS NEEDED
Status: DISCONTINUED | OUTPATIENT
Start: 2024-01-31 | End: 2024-01-31 | Stop reason: SURG

## 2024-01-31 RX ORDER — SODIUM CHLORIDE 9 MG/ML
40 INJECTION, SOLUTION INTRAVENOUS AS NEEDED
Status: DISCONTINUED | OUTPATIENT
Start: 2024-01-31 | End: 2024-01-31 | Stop reason: HOSPADM

## 2024-01-31 RX ORDER — ONDANSETRON 2 MG/ML
4 INJECTION INTRAMUSCULAR; INTRAVENOUS ONCE AS NEEDED
Status: DISCONTINUED | OUTPATIENT
Start: 2024-01-31 | End: 2024-01-31 | Stop reason: HOSPADM

## 2024-01-31 RX ORDER — MAGNESIUM HYDROXIDE 1200 MG/15ML
LIQUID ORAL AS NEEDED
Status: DISCONTINUED | OUTPATIENT
Start: 2024-01-31 | End: 2024-01-31 | Stop reason: HOSPADM

## 2024-01-31 RX ADMIN — LIDOCAINE HYDROCHLORIDE 100 MG: 20 INJECTION, SOLUTION INFILTRATION; PERINEURAL at 08:59

## 2024-01-31 RX ADMIN — PROPOFOL 200 MG: 10 INJECTION, EMULSION INTRAVENOUS at 08:53

## 2024-01-31 RX ADMIN — SODIUM CHLORIDE, POTASSIUM CHLORIDE, SODIUM LACTATE AND CALCIUM CHLORIDE 9 ML/HR: 600; 310; 30; 20 INJECTION, SOLUTION INTRAVENOUS at 08:08

## 2024-01-31 NOTE — ANESTHESIA POSTPROCEDURE EVALUATION
Patient: Pam Taylor    Procedure Summary       Date: 01/31/24 Room / Location: [facility] ENDOSCOPY 1 /  [facility]    Anesthesia Start: 0851 Anesthesia Stop: 0910    Procedure: ESOPHAGOGASTRODUODENOSCOPY WITH BIOPSIES (Esophagus) Diagnosis:       Dyspepsia      Gastritis      Ulcerative esophagitis      (Dyspepsia [R10.13])    Surgeons: Jaziel Myers MD Provider: Felice Sen CRNA    Anesthesia Type: MAC ASA Status: 2            Anesthesia Type: MAC    Vitals  Vitals Value Taken Time   /64 01/31/24 0940   Temp 97.9 °F (36.6 °C) 01/31/24 0912   Pulse 71 01/31/24 0940   Resp 16 01/31/24 0940   SpO2 99 % 01/31/24 0940   Vitals shown include unfiled device data.        Post Anesthesia Care and Evaluation    Patient location during evaluation: PHASE II  Patient participation: complete - patient participated  Level of consciousness: awake  Pain score: 0  Pain management: adequate    Airway patency: patent  Anesthetic complications: No anesthetic complications  PONV Status: none  Cardiovascular status: acceptable  Respiratory status: acceptable  Hydration status: acceptable

## 2024-01-31 NOTE — BRIEF OP NOTE
ESOPHAGOGASTRODUODENOSCOPY  Progress Note    Pam Taylor  1/31/2024    Pre-op Diagnosis:   Dyspepsia [R10.13]       Post-Op Diagnosis Codes:     * Dyspepsia [R10.13]     * Gastritis [K29.70]     * Ulcerative esophagitis [K22.10]    Procedure/CPT® Codes:        Procedure(s):  ESOPHAGOGASTRODUODENOSCOPY WITH BIOPSIES              Surgeon(s):  Jaziel Myers MD    Anesthesia: Monitored Anesthesia Care    Staff:   Circulator: Megan Rodriguez RN  Scrub Person: Della Gibbs         Estimated Blood Loss: none    Urine Voided: * No values recorded between 1/31/2024  8:52 AM and 1/31/2024  9:07 AM *    Specimens:                Specimens       ID Source Type Tests Collected By Collected At Frozen?    A Stomach Tissue TISSUE PATHOLOGY EXAM   Jaziel Myers MD 1/31/24 0903     Description: bx    B Esophagus, Distal Tissue TISSUE PATHOLOGY EXAM   Jaziel Myers MD 1/31/24 0905     Description: bx                  Drains: * No LDAs found *    Findings: Normal Duodenum  Erosive Gastritis-Biopsy  Ulcerative Esophagitis-Biopsy        Complications: None          Jaziel Myers MD     Date: 1/31/2024  Time: 09:09 EST
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
## 2024-01-31 NOTE — H&P
Patient Care Team:  Angela Hairston MD as PCP - General (Family Medicine)    CHIEF COMPLAINT: dyspepsia    HISTORY OF PRESENT ILLNESS:  On Daily PPi    Past Medical History:   Diagnosis Date    Asthma     Asthma     GERD (gastroesophageal reflux disease)      Past Surgical History:   Procedure Laterality Date    COLONOSCOPY N/A 11/09/2018    Procedure: COLONOSCOPY;  Surgeon: Nyla Ames MD;  Location: [facility];  Service: Gastroenterology    FOOT SURGERY Left     tendon repair    LAPAROSCOPIC TUBAL LIGATION       Family History   Problem Relation Age of Onset    Hypertension Father     Esophagitis Sister     SUBHASH disease Sister     Irritable bowel syndrome Sister     Diverticulitis Sister     Irritable bowel syndrome Sister     Colon cancer Neg Hx     Colon polyps Neg Hx      Social History     Tobacco Use    Smoking status: Never     Passive exposure: Never    Smokeless tobacco: Never    Tobacco comments:     N/A   Vaping Use    Vaping Use: Never used   Substance Use Topics    Alcohol use: Not Currently     Comment: socialy    Drug use: No     Medications Prior to Admission   Medication Sig Dispense Refill Last Dose    linaclotide (LINZESS) 145 MCG capsule capsule Take 1 capsule by mouth Every Morning Before Breakfast. 90 capsule 3 1/29/2024    albuterol (PROVENTIL HFA;VENTOLIN HFA) 108 (90 Base) MCG/ACT inhaler Inhale 2 puffs Every 4 (Four) Hours As Needed for Wheezing.   1/27/2024    budesonide-formoterol (SYMBICORT) 160-4.5 MCG/ACT inhaler Inhale 2 puffs As Needed.   1/27/2024    Ibuprofen 200 MG capsule Take  by mouth As Needed.   More than a month    omeprazole (priLOSEC) 40 MG capsule Take 1 capsule by mouth Daily.   1/28/2024     Allergies:  Augmentin [amoxicillin-pot clavulanate] and Penicillins    REVIEW OF SYSTEMS:  Please see the above history of present illness for pertinent positives and negatives.  The remainder of the patient's systems have been reviewed and are negative.     Vital  Signs  Temp:  [97.5 °F (36.4 °C)] 97.5 °F (36.4 °C)    Flowsheet Rows      Flowsheet Row First Filed Value   Admission Height --   Admission Weight 93 kg (205 lb) Documented at 01/31/2024 0739             Physical Exam:  Physical Exam   Constitutional: Patient appears well-developed and well-nourished and in no acute distress   HEENT:   Head: Normocephalic and atraumatic.   Eyes:  Pupils are equal, round, and reactive to light. EOM are intact. Sclerae are anicteric and non-injected.  Mouth and Throat: Patient has moist mucous membranes. Oropharynx is clear of any erythema or exudate.     Neck: Neck supple. No JVD present. No thyromegaly present. No lymphadenopathy present.  Cardiovascular: Regular rate, regular rhythm, S1 normal and S2 normal.  Exam reveals no gallop and no friction rub.  No murmur heard.  Pulmonary/Chest: Lungs are clear to auscultation bilaterally. No respiratory distress. No wheezes. No rhonchi. No rales.   Abdominal: Soft. Bowel sounds are normal. No distension and no mass. There is no hepatosplenomegaly. There is no tenderness.   Musculoskeletal: Normal Muscle tone  Extremities: No edema. Pulses are palpable in all 4 extremities.  Neurological: Patient is alert and oriented to person, place, and time. Cranial nerves II-XII are grossly intact with no focal deficits.  Skin: Skin is warm. No rash noted. Nails show no clubbing.  No cyanosis or erythema.    Debilities/Disabilities Identified: None  Emotional Behavior: Appropriate     Results Review:   I reviewed the patient's new clinical results.    Lab Results (most recent)       None            Imaging Results (Most Recent)       None          reviewed    ECG/EMG Results (most recent)       None          reviewed    Assessment & Plan   dyspepsia/  EGD      I discussed the patient's findings and my recommendations with patient.     Jaziel Myers MD  01/31/24  07:54 EST    Time: 10 min prior to procedure.

## 2024-02-01 LAB
LAB AP CASE REPORT: NORMAL
LAB AP DIAGNOSIS COMMENT: NORMAL
LAB AP SPECIAL STAINS: NORMAL
PATH REPORT.FINAL DX SPEC: NORMAL
PATH REPORT.GROSS SPEC: NORMAL

## 2024-02-05 ENCOUNTER — TELEPHONE (OUTPATIENT)
Dept: GASTROENTEROLOGY | Facility: CLINIC | Age: 34
End: 2024-02-05
Payer: COMMERCIAL

## 2024-03-05 ENCOUNTER — TELEPHONE (OUTPATIENT)
Dept: GASTROENTEROLOGY | Facility: CLINIC | Age: 34
End: 2024-03-05

## 2024-03-05 NOTE — TELEPHONE ENCOUNTER
Caller: DAVID    Relationship to patient: [facility]     Best call back number: [phone]    Patient is needing: DAVID FROM [facility] CONTACTING TO HAVE LAST 2 CHART NOTES FROM DR WANG AND PATHOLOGY RESULTS FROM PROCEDURE ON 01/31/2024 TO BE SENT TO THEM AT FAX# [phone]

## 2024-03-07 ENCOUNTER — TELEPHONE (OUTPATIENT)
Dept: GASTROENTEROLOGY | Facility: CLINIC | Age: 34
End: 2024-03-07

## 2024-03-07 ENCOUNTER — TRANSCRIBE ORDERS (OUTPATIENT)
Dept: ADMINISTRATIVE | Facility: HOSPITAL | Age: 34
End: 2024-03-07
Payer: COMMERCIAL

## 2024-03-07 ENCOUNTER — HOSPITAL ENCOUNTER (OUTPATIENT)
Dept: GENERAL RADIOLOGY | Facility: HOSPITAL | Age: 34
Discharge: HOME | End: 2024-03-07
Admitting: INTERNAL MEDICINE
Payer: COMMERCIAL

## 2024-03-07 ENCOUNTER — OFFICE VISIT (OUTPATIENT)
Dept: GASTROENTEROLOGY | Facility: CLINIC | Age: 34
End: 2024-03-07
Payer: COMMERCIAL

## 2024-03-07 VITALS
BODY MASS INDEX: 35.72 KG/M2 | HEIGHT: 63 IN | SYSTOLIC BLOOD PRESSURE: 120 MMHG | WEIGHT: 201.6 LBS | DIASTOLIC BLOOD PRESSURE: 80 MMHG

## 2024-03-07 DIAGNOSIS — R79.82 ELEVATED C-REACTIVE PROTEIN (CRP): ICD-10-CM

## 2024-03-07 DIAGNOSIS — M79.89 SWELLING OF LIMB: ICD-10-CM

## 2024-03-07 DIAGNOSIS — Z79.899 ENCOUNTER FOR LONG-TERM (CURRENT) USE OF OTHER MEDICATIONS: ICD-10-CM

## 2024-03-07 DIAGNOSIS — Z11.3 SCREENING EXAMINATION FOR VENEREAL DISEASE: Primary | ICD-10-CM

## 2024-03-07 DIAGNOSIS — K21.9 CHALASIA OF LOWER ESOPHAGEAL SPHINCTER: ICD-10-CM

## 2024-03-07 DIAGNOSIS — Z11.3 SCREENING EXAMINATION FOR VENEREAL DISEASE: ICD-10-CM

## 2024-03-07 DIAGNOSIS — Z11.59 SCREENING EXAMINATION FOR POLIOMYELITIS: ICD-10-CM

## 2024-03-07 DIAGNOSIS — K21.00 GASTROESOPHAGEAL REFLUX DISEASE WITH ESOPHAGITIS WITHOUT HEMORRHAGE: Primary | ICD-10-CM

## 2024-03-07 DIAGNOSIS — R76.8 FALSE POSITIVE SEROLOGICAL TEST FOR SYPHILIS: ICD-10-CM

## 2024-03-07 DIAGNOSIS — K59.04 CHRONIC IDIOPATHIC CONSTIPATION: ICD-10-CM

## 2024-03-07 PROCEDURE — 99213 OFFICE O/P EST LOW 20 MIN: CPT | Performed by: INTERNAL MEDICINE

## 2024-03-07 PROCEDURE — 71046 X-RAY EXAM CHEST 2 VIEWS: CPT

## 2024-03-07 NOTE — PROGRESS NOTES
PATIENT INFORMATION  Pam Taylor       - 1990    CHIEF COMPLAINT  Chief Complaint   Patient presents with    Gastritis    Ulcerative esophagitis    Chronic idiopathic constipation       HISTORY OF PRESENT ILLNESS  Doing much better on her meds and has stopped Caffiene and eating smller more frequent meals and no Dysphagia     Is on daily Linzess and is going daily so overal doing very well    Did see rheum last week         REVIEWED PERTINENT RESULTS/ LABS  Lab Results   Component Value Date    CASEREPORT  2024     Surgical Pathology Report                         Case: [id number]                                  Authorizing Provider:  Jaziel Myers        Collected:           2024 09:03 AM                                 MD Karley                                                                   Ordering Location:     [facility]   Received:            2024 09:44 AM                                 OR                                                                           Pathologist:           Kael Sandoval MD                                                         Specimens:   1) - Stomach, bx                                                                                    2) - Esophagus, Distal, bx                                                                 FINALDX  2024     1.  Stomach biopsy: Gastric mucosa with   A.  Mild chronic inflammation.   B.  No H. pylori-like organisms identified by routine staining.    C.  Reactive change noted, no dysplasia nor malignancy identified.    2.  Distal esophagus biopsy: Squamous and glandular mucosa with   A.  Ulceration with necroinflammatory debris.   B.  Inflamed granulation tissue with mixed acute and chronic inflammation and eosinophils,   C.  No definitive intestinal metaplasia identified by routine and/or special staining (see comment)  D.  Atypical repair noted, no dysplasia nor malignancy  identified (see comment)       Lab Results   Component Value Date    HGB 12.8 04/30/2014    MCV 83.9 04/30/2014     04/30/2014    ALT 22 04/30/2014    AST 24 04/30/2014      No results found.    REVIEW OF SYSTEMS  Review of Systems   Constitutional:  Negative for activity change, chills, fever and unexpected weight change.   HENT:  Negative for congestion.    Eyes:  Negative for visual disturbance.   Respiratory:  Negative for shortness of breath.    Cardiovascular:  Negative for chest pain and palpitations.   Gastrointestinal:  Positive for abdominal distention, abdominal pain and diarrhea. Negative for blood in stool.   Endocrine: Negative for cold intolerance and heat intolerance.   Genitourinary:  Negative for hematuria.   Musculoskeletal:  Negative for gait problem.   Skin:  Negative for color change.   Allergic/Immunologic: Negative for immunocompromised state.   Neurological:  Negative for weakness and light-headedness.   Hematological:  Negative for adenopathy.   Psychiatric/Behavioral:  Negative for sleep disturbance. The patient is not nervous/anxious.          ACTIVE PROBLEMS  Patient Active Problem List    Diagnosis     Dyspepsia [R10.13]     Blood in stool [K92.1]     Pain of upper abdomen [R10.10]     Constipation [K59.00]          PAST MEDICAL HISTORY  Past Medical History:   Diagnosis Date    Asthma     Asthma     GERD (gastroesophageal reflux disease)          SURGICAL HISTORY  Past Surgical History:   Procedure Laterality Date    COLONOSCOPY N/A 11/09/2018    Procedure: COLONOSCOPY;  Surgeon: Nyla Ames MD;  Location: [facility];  Service: Gastroenterology    ENDOSCOPY N/A 1/31/2024    Procedure: ESOPHAGOGASTRODUODENOSCOPY WITH BIOPSIES;  Surgeon: Jaziel Myers MD;  Location: [facility] OR;  Service: Gastroenterology;  Laterality: N/A;    FOOT SURGERY Left     tendon repair    LAPAROSCOPIC TUBAL LIGATION           FAMILY HISTORY  Family History   Problem Relation Age of Onset  "   Hypertension Father     Esophagitis Sister     SUBHASH disease Sister     Irritable bowel syndrome Sister     Diverticulitis Sister     Irritable bowel syndrome Sister     Colon cancer Neg Hx     Colon polyps Neg Hx          SOCIAL HISTORY  Social History     Occupational History    Not on file   Tobacco Use    Smoking status: Never     Passive exposure: Never    Smokeless tobacco: Never    Tobacco comments:     N/A   Vaping Use    Vaping status: Never Used   Substance and Sexual Activity    Alcohol use: Not Currently     Comment: socialy    Drug use: No    Sexual activity: Defer     Partners: Male         CURRENT MEDICATIONS    Current Outpatient Medications:     albuterol (PROVENTIL HFA;VENTOLIN HFA) 108 (90 Base) MCG/ACT inhaler, Inhale 2 puffs Every 4 (Four) Hours As Needed for Wheezing., Disp: , Rfl:     budesonide-formoterol (SYMBICORT) 160-4.5 MCG/ACT inhaler, Inhale 2 puffs As Needed., Disp: , Rfl:     linaclotide (LINZESS) 145 MCG capsule capsule, Take 1 capsule by mouth Every Morning Before Breakfast., Disp: 90 capsule, Rfl: 3    omeprazole (priLOSEC) 40 MG capsule, Take 1 capsule by mouth 2 (Two) Times a Day Before Meals., Disp: 180 capsule, Rfl: 3    ALLERGIES  Augmentin [amoxicillin-pot clavulanate] and Penicillins    VITALS  Vitals:    03/07/24 0910   BP: 120/80   BP Location: Left arm   Patient Position: Sitting   Cuff Size: Adult   Weight: 91.4 kg (201 lb 9.6 oz)   Height: 160 cm (63\")       PHYSICAL EXAM  Debilities/Disabilities Identified: None  Emotional Behavior: Appropriate  Wt Readings from Last 3 Encounters:   03/07/24 91.4 kg (201 lb 9.6 oz)   01/31/24 93 kg (205 lb)   12/06/23 94.3 kg (207 lb 12.8 oz)     Ht Readings from Last 1 Encounters:   03/07/24 160 cm (63\")     Body mass index is 35.71 kg/m².  Physical Exam  Constitutional:       Appearance: She is well-developed. She is not diaphoretic.   HENT:      Head: Normocephalic and atraumatic.   Eyes:      General: No scleral icterus.     " Conjunctiva/sclera: Conjunctivae normal.      Pupils: Pupils are equal, round, and reactive to light.   Neck:      Thyroid: No thyromegaly.   Cardiovascular:      Rate and Rhythm: Normal rate and regular rhythm.      Heart sounds: Normal heart sounds. No murmur heard.     No gallop.   Pulmonary:      Effort: Pulmonary effort is normal.      Breath sounds: Normal breath sounds. No wheezing or rales.   Abdominal:      General: Bowel sounds are normal. There is no distension or abdominal bruit.      Palpations: Abdomen is soft. There is no shifting dullness, fluid wave or mass.      Tenderness: There is no abdominal tenderness. There is no guarding. Negative signs include Rodriguez's sign.      Hernia: There is no hernia in the ventral area.   Musculoskeletal:         General: Normal range of motion.      Cervical back: Normal range of motion and neck supple.   Lymphadenopathy:      Cervical: No cervical adenopathy.   Skin:     General: Skin is warm and dry.      Findings: No erythema or rash.   Neurological:      Mental Status: She is alert and oriented to person, place, and time.   Psychiatric:         Mood and Affect: Mood normal.         Behavior: Behavior normal.         CLINICAL DATA REVIEWED   reviewed previous lab results and integrated with today's visit, reviewed notes from other physicians and/or last GI encounter, reviewed previous endoscopy results and available photos, reviewed surgical pathology results from previous biopsies    ASSESSMENT  Diagnoses and all orders for this visit:    Gastroesophageal reflux disease with esophagitis without hemorrhage    Chronic idiopathic constipation          PLAN  Return in about 1 year (around 3/7/2025).    I have discussed the above plan with the patient.  They verbalize understanding and are in agreement with the plan.  They have been advised to contact the office for any questions, concerns, or changes related to their health.

## 2024-03-07 NOTE — TELEPHONE ENCOUNTER
Caller: DAVID    Relationship: Other    Best call back number: [phone]    What form or medical record are you requesting: CLS AND COLON BIOPSY NOTES    Who is requesting this form or medical record from you: DR ROCAEL SOLANO AT [facility]     How would you like to receive the form or medical records (pick-up, mail, fax): FAX  If fax, what is the fax number: [phone]      Timeframe paperwork needed: AS SOON AS POSSIBLE    Additional notes: WORKING ON MEDICATION SET UP FOR PT

## 2024-03-07 NOTE — TELEPHONE ENCOUNTER
Faxed today.  Faxed over EGD 01/31/2024 with pathology by Dr. Myers.  Colonoscopy 11/09/2018 by Dr. Ames, no pathology.

## 2024-12-09 RX ORDER — LINACLOTIDE 145 UG/1
145 CAPSULE, GELATIN COATED ORAL
Qty: 90 CAPSULE | Refills: 0 | Status: SHIPPED | OUTPATIENT
Start: 2024-12-09

## 2024-12-09 RX ORDER — OMEPRAZOLE 40 MG/1
CAPSULE, DELAYED RELEASE ORAL
Qty: 180 CAPSULE | Refills: 0 | Status: SHIPPED | OUTPATIENT
Start: 2024-12-09

## (undated) DEVICE — Device: Brand: DEFENDO AIR/WATER/SUCTION AND BIOPSY VALVE

## (undated) DEVICE — SYR LL W/SCALE/MARK 3ML STRL

## (undated) DEVICE — KT ORCA ORCAPOD DISP STRL

## (undated) DEVICE — GLV SURG SENSICARE PI MIC PF SZ8 LF STRL

## (undated) DEVICE — BW-412T DISP COMBO CLEANING BRUSH: Brand: SINGLE USE COMBINATION CLEANING BRUSH

## (undated) DEVICE — FRCP BX RADJAW4 NDL 2.8 240CM LG OG BX40

## (undated) DEVICE — SOL IRR H2O BTL 1000ML STRL

## (undated) DEVICE — MASK,FACE,FLUID RESIST,SHLD,EARLOOP: Brand: MEDLINE

## (undated) DEVICE — SUCTION CANISTER, 3000CC,SAFELINER: Brand: DEROYAL

## (undated) DEVICE — Device

## (undated) DEVICE — LINER SURG CANSTR SXN S/RIGD 1500CC

## (undated) DEVICE — JACKT LAB F/R KNIT CUFF/COLR XLG BLU

## (undated) DEVICE — ENDO. PORT CONNECTOR W/VALVE FOR OLYMPUS® SCOPES: Brand: ERBE

## (undated) DEVICE — THE BITE BLOCK MAXI, LATEX FREE STRAP IS USED TO PROTECT THE ENDOSCOPE INSERTION TUBE FROM BEING BITTEN BY THE PATIENT.

## (undated) DEVICE — ADAPT CLN BIOGUARD AIR/H2O DISP

## (undated) DEVICE — GOWN ISOL W/THUMB UNIV BLU BX/15

## (undated) DEVICE — VIAL FORMALIN CAP 10P 40ML

## (undated) DEVICE — SPNG GZ WOVN 4X4IN 12PLY 10/BX STRL

## (undated) DEVICE — JACKT LAB KNIT COLR LG BLU

## (undated) DEVICE — SYR LL 3CC